# Patient Record
Sex: MALE | Race: WHITE | URBAN - METROPOLITAN AREA
[De-identification: names, ages, dates, MRNs, and addresses within clinical notes are randomized per-mention and may not be internally consistent; named-entity substitution may affect disease eponyms.]

---

## 2023-09-26 ENCOUNTER — OFFICE VISIT (OUTPATIENT)
Dept: URBAN - METROPOLITAN AREA CLINIC 2 | Facility: CLINIC | Age: 68
End: 2023-09-26

## 2023-09-26 NOTE — HPI-TODAY'S VISIT:
This patient was referred by Dr. Carlyle Tipton for an evaluation of iron deficiency anemia.  A copy of this will be sent to the referring provider.

## 2023-11-02 ENCOUNTER — OFFICE VISIT (OUTPATIENT)
Dept: URBAN - METROPOLITAN AREA CLINIC 2 | Facility: CLINIC | Age: 68
End: 2023-11-02

## 2023-12-04 ENCOUNTER — OFFICE VISIT (OUTPATIENT)
Dept: URBAN - METROPOLITAN AREA CLINIC 2 | Facility: CLINIC | Age: 68
End: 2023-12-04

## 2024-01-29 ENCOUNTER — TELEPHONE ENCOUNTER (OUTPATIENT)
Dept: URBAN - METROPOLITAN AREA CLINIC 80 | Facility: CLINIC | Age: 69
End: 2024-01-29

## 2024-01-29 ENCOUNTER — OFFICE VISIT (OUTPATIENT)
Dept: URBAN - METROPOLITAN AREA CLINIC 2 | Facility: CLINIC | Age: 69
End: 2024-01-29
Payer: MEDICARE

## 2024-01-29 VITALS
TEMPERATURE: 97.7 F | DIASTOLIC BLOOD PRESSURE: 76 MMHG | HEIGHT: 72 IN | SYSTOLIC BLOOD PRESSURE: 114 MMHG | HEART RATE: 73 BPM | BODY MASS INDEX: 34.84 KG/M2 | WEIGHT: 257.2 LBS

## 2024-01-29 DIAGNOSIS — D50.8 ACHLORHYDRIC ANEMIA: ICD-10-CM

## 2024-01-29 PROBLEM — 87522002: Status: ACTIVE | Noted: 2024-01-29

## 2024-01-29 PROCEDURE — 99243 OFF/OP CNSLTJ NEW/EST LOW 30: CPT | Performed by: NURSE PRACTITIONER

## 2024-01-29 PROCEDURE — 99203 OFFICE O/P NEW LOW 30 MIN: CPT | Performed by: NURSE PRACTITIONER

## 2024-01-29 RX ORDER — POLYETHYLENE GLYCOL 3350, SODIUM CHLORIDE, SODIUM BICARBONATE, POTASSIUM CHLORIDE 420; 11.2; 5.72; 1.48 G/4L; G/4L; G/4L; G/4L
4000ML POWDER, FOR SOLUTION ORAL
Qty: 4000 MILLILITER | Refills: 0 | OUTPATIENT
Start: 2024-01-29 | End: 2024-01-30

## 2024-01-29 NOTE — HPI-TODAY'S VISIT:
This patient was referred by Dr. Carlyle Tipton for an evaluation of Iron defiency anemia.  A copy of this will be sent to the referring provider. 68 yr old male with hx of left renal cell ca s/p resection and ablation in 2011 and 2018.. seen by oncology last year for leukocytosis. He was found to have BAHMAN and recommended see GI for evaluation for GI bleed. He was started on oral iron d/t BAHMAN. pt has been on ozempic and lost 50 pounds in past yr. stopped for a while bc of adverse effects. now on lower dose. tolerating mostly. He had gastric emptying scan 1/2023 with severe gastroparesis at 4 hr only 36% empty.  He does have GERD and has abdominal pain if eats spicy foods. also has constipation-slightly worse on oral iron had MRI notable for spot on pancreas possible IPMN -8mm was 7mm in 2018. saw Dr Bond-given size-planned to repeat imaging in 1 yr

## 2024-02-05 ENCOUNTER — OV NP (OUTPATIENT)
Dept: URBAN - METROPOLITAN AREA CLINIC 2 | Facility: CLINIC | Age: 69
End: 2024-02-05

## 2024-02-09 ENCOUNTER — COL/EGD (OUTPATIENT)
Dept: URBAN - METROPOLITAN AREA SURGERY CENTER 19 | Facility: SURGERY CENTER | Age: 69
End: 2024-02-09

## 2024-02-15 ENCOUNTER — OV NP (OUTPATIENT)
Dept: URBAN - METROPOLITAN AREA SURGERY CENTER 19 | Facility: SURGERY CENTER | Age: 69
End: 2024-02-15

## 2024-03-06 ENCOUNTER — LAB (OUTPATIENT)
Dept: URBAN - METROPOLITAN AREA CLINIC 4 | Facility: CLINIC | Age: 69
End: 2024-03-06
Payer: MEDICARE

## 2024-03-06 ENCOUNTER — COL/EGD (OUTPATIENT)
Dept: URBAN - METROPOLITAN AREA SURGERY CENTER 19 | Facility: SURGERY CENTER | Age: 69
End: 2024-03-06
Payer: MEDICARE

## 2024-03-06 DIAGNOSIS — T47.8X5A ADVERSE EFFECT OF OTHER AGENTS PRIMARILY AFFECTING GASTROINTESTINAL SYSTEM, INITIAL ENCOUNTER: ICD-10-CM

## 2024-03-06 DIAGNOSIS — K31.89 OTHER DISEASES OF STOMACH AND DUODENUM: ICD-10-CM

## 2024-03-06 DIAGNOSIS — D50.9 ANEMIA: ICD-10-CM

## 2024-03-06 PROCEDURE — 45378 DIAGNOSTIC COLONOSCOPY: CPT | Performed by: INTERNAL MEDICINE

## 2024-03-06 PROCEDURE — 88305 TISSUE EXAM BY PATHOLOGIST: CPT | Performed by: PATHOLOGY

## 2024-03-06 PROCEDURE — 43239 EGD BIOPSY SINGLE/MULTIPLE: CPT | Performed by: INTERNAL MEDICINE

## 2024-03-06 PROCEDURE — 88312 SPECIAL STAINS GROUP 1: CPT | Performed by: PATHOLOGY

## 2024-04-01 ENCOUNTER — LAB (OUTPATIENT)
Dept: URBAN - METROPOLITAN AREA CLINIC 2 | Facility: CLINIC | Age: 69
End: 2024-04-01

## 2024-04-01 ENCOUNTER — OV EP (OUTPATIENT)
Dept: URBAN - METROPOLITAN AREA CLINIC 2 | Facility: CLINIC | Age: 69
End: 2024-04-01
Payer: MEDICARE

## 2024-04-01 VITALS
DIASTOLIC BLOOD PRESSURE: 71 MMHG | TEMPERATURE: 96.9 F | HEART RATE: 69 BPM | SYSTOLIC BLOOD PRESSURE: 127 MMHG | HEIGHT: 72 IN | BODY MASS INDEX: 35.76 KG/M2 | WEIGHT: 264 LBS

## 2024-04-01 DIAGNOSIS — D50.8 OTHER IRON DEFICIENCY ANEMIA: ICD-10-CM

## 2024-04-01 DIAGNOSIS — K59.04 CHRONIC IDIOPATHIC CONSTIPATION: ICD-10-CM

## 2024-04-01 PROBLEM — 82934008: Status: ACTIVE | Noted: 2024-04-01

## 2024-04-01 PROCEDURE — 99214 OFFICE O/P EST MOD 30 MIN: CPT | Performed by: NURSE PRACTITIONER

## 2024-04-01 RX ORDER — INSULIN DEGLUDEC 200 U/ML
INJECT 70 UNITS UNDER THE SKIN DAILY INJECTION, SOLUTION SUBCUTANEOUS
Qty: 9 MILLILITER | Refills: 0 | Status: ACTIVE | COMMUNITY

## 2024-04-01 RX ORDER — TIRZEPATIDE 2.5 MG/.5ML
INJECTION, SOLUTION SUBCUTANEOUS
Qty: 2 MILLILITER | Status: ACTIVE | COMMUNITY

## 2024-04-01 NOTE — HPI-TODAY'S VISIT:
Very pleasant 68-year-old male with history of renal cell cancer status post resection and ablation in 2011 in 2018 seen in follow-up today and EGD and colonoscopy in evaluation of iron deficiency anemia.  EGD was notable for diminutive polyps in stomach and biopsy proton pump inhibitor effect.  Colonoscopy was notable for multiple sigmoid and descending diverticula and moderate internal hemorrhoids. He follows with hematology givne hx of RCC. repeat labs with improvement of iron deficiency. He has f/u with hematology next week. hehas constipation. If no BM in several days, he will take miralax. If that does not work, next day he takes milk of magnesia. He will have multiple BMs then and feels very drained. c/o increased gas/bloating.

## 2024-04-23 ENCOUNTER — PILLCAM (OUTPATIENT)
Dept: URBAN - METROPOLITAN AREA CLINIC 79 | Facility: CLINIC | Age: 69
End: 2024-04-23

## 2024-05-02 ENCOUNTER — WEB ENCOUNTER (OUTPATIENT)
Dept: URBAN - METROPOLITAN AREA CLINIC 2 | Facility: CLINIC | Age: 69
End: 2024-05-02

## 2024-05-02 ENCOUNTER — DASHBOARD ENCOUNTERS (OUTPATIENT)
Age: 69
End: 2024-05-02

## 2024-05-02 ENCOUNTER — OFFICE VISIT (OUTPATIENT)
Dept: URBAN - METROPOLITAN AREA CLINIC 2 | Facility: CLINIC | Age: 69
End: 2024-05-02
Payer: MEDICARE

## 2024-05-02 VITALS
HEART RATE: 72 BPM | WEIGHT: 264.8 LBS | TEMPERATURE: 97.8 F | DIASTOLIC BLOOD PRESSURE: 64 MMHG | HEIGHT: 72 IN | BODY MASS INDEX: 35.87 KG/M2 | SYSTOLIC BLOOD PRESSURE: 113 MMHG

## 2024-05-02 DIAGNOSIS — K59.04 CHRONIC IDIOPATHIC CONSTIPATION: ICD-10-CM

## 2024-05-02 DIAGNOSIS — R10.84 GENERALIZED ABDOMINAL CRAMPING: ICD-10-CM

## 2024-05-02 DIAGNOSIS — D50.9 IRON DEFICIENCY ANEMIA, UNSPECIFIED IRON DEFICIENCY ANEMIA TYPE: ICD-10-CM

## 2024-05-02 PROCEDURE — 99213 OFFICE O/P EST LOW 20 MIN: CPT | Performed by: NURSE PRACTITIONER

## 2024-05-02 RX ORDER — DICYCLOMINE HYDROCHLORIDE 10 MG/1
1 CAPSULE 30 CAPSULE ORAL THREE TIMES A DAY
Qty: 90 CAPSULE | Refills: 0 | OUTPATIENT
Start: 2024-05-02 | End: 2024-06-01

## 2024-05-02 RX ORDER — INSULIN DEGLUDEC 200 U/ML
INJECT 70 UNITS UNDER THE SKIN DAILY INJECTION, SOLUTION SUBCUTANEOUS
Qty: 9 MILLILITER | Refills: 0 | Status: ACTIVE | COMMUNITY

## 2024-05-02 RX ORDER — TIRZEPATIDE 2.5 MG/.5ML
INJECTION, SOLUTION SUBCUTANEOUS
Qty: 2 MILLILITER | Status: DISCONTINUED | COMMUNITY

## 2024-08-05 ENCOUNTER — WEB ENCOUNTER (OUTPATIENT)
Dept: URBAN - METROPOLITAN AREA CLINIC 2 | Facility: CLINIC | Age: 69
End: 2024-08-05

## 2024-08-05 RX ORDER — DICYCLOMINE HYDROCHLORIDE 10 MG/1
1 CAPSULE 30 CAPSULE ORAL THREE TIMES A DAY
Qty: 90 CAPSULE | Refills: 0
Start: 2024-05-02 | End: 2024-09-05

## 2025-02-27 ENCOUNTER — WEB ENCOUNTER (OUTPATIENT)
Dept: URBAN - METROPOLITAN AREA CLINIC 2 | Facility: CLINIC | Age: 70
End: 2025-02-27

## 2025-02-27 RX ORDER — DICYCLOMINE HYDROCHLORIDE 10 MG/1
1 CAPSULE 30 CAPSULE ORAL THREE TIMES A DAY
Qty: 90 CAPSULE | Refills: 0
Start: 2024-05-02 | End: 2025-03-29

## 2025-04-16 ENCOUNTER — OFFICE VISIT (OUTPATIENT)
Dept: URBAN - METROPOLITAN AREA CLINIC 2 | Facility: CLINIC | Age: 70
End: 2025-04-16
Payer: MEDICARE

## 2025-04-16 DIAGNOSIS — K59.04 CHRONIC IDIOPATHIC CONSTIPATION: ICD-10-CM

## 2025-04-16 DIAGNOSIS — K64.8 INTERNAL HEMORRHOIDS: ICD-10-CM

## 2025-04-16 DIAGNOSIS — K64.4 EXTERNAL HEMORRHOIDS: ICD-10-CM

## 2025-04-16 PROBLEM — 23913003: Status: ACTIVE | Noted: 2025-04-16

## 2025-04-16 PROCEDURE — 99214 OFFICE O/P EST MOD 30 MIN: CPT | Performed by: NURSE PRACTITIONER

## 2025-04-16 RX ORDER — HYDROCORTISONE ACETATE 25 MG/1
1 SUPPOSITORY SUPPOSITORY RECTAL ONCE A DAY
Qty: 24 | Refills: 2 | OUTPATIENT
Start: 2025-04-16 | End: 2025-05-28

## 2025-04-16 RX ORDER — INSULIN DEGLUDEC 200 U/ML
INJECT 70 UNITS UNDER THE SKIN DAILY INJECTION, SOLUTION SUBCUTANEOUS
Qty: 9 MILLILITER | Refills: 0 | Status: ACTIVE | COMMUNITY

## 2025-04-16 RX ORDER — HYDROCORTISONE 25 MG/G
1 APPLICATION CREAM TOPICAL ONCE A DAY
Qty: 28.35 GRAM | Refills: 0 | OUTPATIENT
Start: 2025-04-16

## 2025-04-16 RX ORDER — LINACLOTIDE 290 UG/1
1 CAPSULE AT LEAST 30 MINUTES BEFORE THE FIRST MEAL OF THE DAY ON AN EMPTY STOMACH CAPSULE, GELATIN COATED ORAL ONCE A DAY
Qty: 30 | OUTPATIENT
Start: 2025-04-16 | End: 2025-05-16

## 2025-04-16 RX ORDER — TIRZEPATIDE 5 MG/.5ML
INJECT 5 MG SUBCUTANEOUSLY WEEKLY INJECTION, SOLUTION SUBCUTANEOUS
Qty: 2 MILLILITER | Refills: 0 | Status: ACTIVE | COMMUNITY

## 2025-04-16 NOTE — HPI-TODAY'S VISIT:
Very pleasant 69-year-old male seen today for constipation and rectal bleeding.  He takes [id number] , stool softeners, and will add stimulants if no results.  He has poor results last week. symptoms were severe and he finally had a good bowel movement but had increased rectal pain and bleeding.  He had a colonoscopy last year notable for diverticulosis, polyps, and moderate internal hemorrhoids. Taking mounjaro for DM and weight loss

## 2025-05-05 ENCOUNTER — TELEPHONE ENCOUNTER (OUTPATIENT)
Dept: URBAN - METROPOLITAN AREA CLINIC 79 | Facility: CLINIC | Age: 70
End: 2025-05-05

## 2025-05-05 RX ORDER — LINACLOTIDE 290 UG/1
1 CAPSULE AT LEAST 30 MINUTES BEFORE THE FIRST MEAL OF THE DAY ON AN EMPTY STOMACH CAPSULE, GELATIN COATED ORAL ONCE A DAY
Qty: 30
Start: 2025-04-16 | End: 2025-06-04

## 2025-05-05 RX ORDER — ONDANSETRON HYDROCHLORIDE 4 MG/1
1 TABLET TABLET, FILM COATED ORAL EVERY 8 HOURS
Qty: 10 TABLET | Refills: 0 | OUTPATIENT
Start: 2025-05-05

## 2025-05-09 ENCOUNTER — WEB ENCOUNTER (OUTPATIENT)
Dept: URBAN - METROPOLITAN AREA CLINIC 2 | Facility: CLINIC | Age: 70
End: 2025-05-09

## 2025-05-09 RX ORDER — DICYCLOMINE HYDROCHLORIDE 10 MG/1
1 CAPSULE 30 CAPSULE ORAL THREE TIMES A DAY
Qty: 90 CAPSULE | Refills: 0
Start: 2024-05-02

## 2025-06-10 ENCOUNTER — TELEPHONE ENCOUNTER (OUTPATIENT)
Dept: URBAN - METROPOLITAN AREA CLINIC 79 | Facility: CLINIC | Age: 70
End: 2025-06-10

## 2025-06-10 RX ORDER — DICYCLOMINE HYDROCHLORIDE 10 MG/1
1 CAPSULE 30 CAPSULE ORAL THREE TIMES A DAY
Qty: 90 CAPSULE | Refills: 0
Start: 2024-05-02

## 2025-07-08 ENCOUNTER — LAB OUTSIDE AN ENCOUNTER (OUTPATIENT)
Dept: URBAN - METROPOLITAN AREA CLINIC 2 | Facility: CLINIC | Age: 70
End: 2025-07-08

## 2025-07-08 ENCOUNTER — OFFICE VISIT (OUTPATIENT)
Dept: URBAN - METROPOLITAN AREA CLINIC 2 | Facility: CLINIC | Age: 70
End: 2025-07-08
Payer: MEDICARE

## 2025-07-08 DIAGNOSIS — R19.7 DIARRHEA, UNSPECIFIED TYPE: ICD-10-CM

## 2025-07-08 DIAGNOSIS — R10.30 LOWER ABDOMINAL PAIN: ICD-10-CM

## 2025-07-08 PROCEDURE — 99214 OFFICE O/P EST MOD 30 MIN: CPT | Performed by: NURSE PRACTITIONER

## 2025-07-08 RX ORDER — HYDROCORTISONE 25 MG/G
1 APPLICATION CREAM TOPICAL ONCE A DAY
Qty: 28.35 GRAM | Refills: 0 | Status: ACTIVE | COMMUNITY
Start: 2025-04-16

## 2025-07-08 RX ORDER — TIRZEPATIDE 12.5 MG/.5ML
AS DIRECTED INJECTION, SOLUTION SUBCUTANEOUS
Refills: 0 | Status: ACTIVE | COMMUNITY

## 2025-07-08 RX ORDER — CIPROFLOXACIN 500 MG/1
1 TABLET TABLET, FILM COATED ORAL
Qty: 20 TABLET | Refills: 0 | OUTPATIENT
Start: 2025-07-08 | End: 2025-07-18

## 2025-07-08 RX ORDER — METRONIDAZOLE 500 MG/1
1 TABLET TABLET ORAL THREE TIMES A DAY
Qty: 30 | OUTPATIENT
Start: 2025-07-08 | End: 2025-07-18

## 2025-07-08 RX ORDER — ONDANSETRON HYDROCHLORIDE 4 MG/1
1 TABLET TABLET, FILM COATED ORAL EVERY 8 HOURS
Qty: 10 TABLET | Refills: 0 | Status: ACTIVE | COMMUNITY
Start: 2025-05-05

## 2025-07-08 RX ORDER — DICYCLOMINE HYDROCHLORIDE 10 MG/1
1 CAPSULE 30 CAPSULE ORAL THREE TIMES A DAY
Qty: 90 CAPSULE | Refills: 0 | Status: ACTIVE | COMMUNITY
Start: 2024-05-02

## 2025-07-08 RX ORDER — INSULIN DEGLUDEC 200 U/ML
INJECT 70 UNITS UNDER THE SKIN DAILY INJECTION, SOLUTION SUBCUTANEOUS
Qty: 9 MILLILITER | Refills: 0 | Status: ACTIVE | COMMUNITY

## 2025-07-08 NOTE — HPI-TODAY'S VISIT:
69-year-old male with history of chronic constipation seen today with lower abdominal pain.  At his last visit in April we did increase Linzess to 290 mcg. Constipation began when he started Mounjaro.  He is considering stopping or taking a break but is concerned about blood sugar control.  Constipation symptoms have been managed on Linzess 290.  However on 4 July he was eating with friends, had a few alcoholic beverages.  Later that day he developed chills and rigors.  Associated symptoms included lower abdominal pain and diarrhea.  Symptoms persisted over the weekend.  No further chills or fever.  He has continued to have diarrhea and lower abdominal pain.

## 2025-07-08 NOTE — PHYSICAL EXAM GASTROINTESTINAL
Abdomen , soft, lower, suprapubic tenderness to palpation, nondistended , no guarding or rigidity , no masses palpable , normal bowel sounds

## 2025-07-09 ENCOUNTER — OFFICE VISIT (OUTPATIENT)
Dept: URBAN - METROPOLITAN AREA CLINIC 2 | Facility: CLINIC | Age: 70
End: 2025-07-09

## 2025-07-09 LAB
A/G RATIO: 1.6
ALBUMIN: 4.2
ALKALINE PHOSPHATASE: 68
ALT (SGPT): 17
AST (SGOT): 16
BILIRUBIN, TOTAL: 0.7
BUN/CREATININE RATIO: (no result)
BUN: 18
CALCIUM: 9.5
CARBON DIOXIDE, TOTAL: 22
CHLORIDE: 105
CREATININE: 0.95
EGFR: 88
GLOBULIN, TOTAL: 2.7
GLUCOSE: 118
HEMATOCRIT: 47.1
HEMOGLOBIN: 15.5
MCH: 27.6
MCHC: 32.9
MCV: 84
MPV: 9.9
PLATELET COUNT: 290
POTASSIUM: 4
PROTEIN, TOTAL: 6.9
RDW: 13.3
RED BLOOD CELL COUNT: 5.61
SODIUM: 141
WHITE BLOOD CELL COUNT: 6.7

## 2025-07-11 ENCOUNTER — ERX REFILL RESPONSE (OUTPATIENT)
Dept: URBAN - METROPOLITAN AREA CLINIC 80 | Facility: CLINIC | Age: 70
End: 2025-07-11

## 2025-07-11 RX ORDER — DICYCLOMINE HYDROCHLORIDE 10 MG/1
1 CAPSULE 30 CAPSULE ORAL THREE TIMES A DAY
Qty: 90 CAPSULE | Refills: 0

## 2025-07-23 ENCOUNTER — OFFICE VISIT (OUTPATIENT)
Dept: URBAN - METROPOLITAN AREA CLINIC 2 | Facility: CLINIC | Age: 70
End: 2025-07-23
Payer: MEDICARE

## 2025-07-23 DIAGNOSIS — K64.8 INTERNAL HEMORRHOID: ICD-10-CM

## 2025-07-23 DIAGNOSIS — K59.04 CHRONIC IDIOPATHIC CONSTIPATION: ICD-10-CM

## 2025-07-23 PROCEDURE — 99213 OFFICE O/P EST LOW 20 MIN: CPT | Performed by: NURSE PRACTITIONER

## 2025-07-23 RX ORDER — HYDROCORTISONE 25 MG/G
1 APPLICATION CREAM TOPICAL ONCE A DAY
Qty: 28.35 GRAM | Refills: 0 | Status: ACTIVE | COMMUNITY
Start: 2025-04-16

## 2025-07-23 RX ORDER — INSULIN DEGLUDEC 200 U/ML
INJECT 70 UNITS UNDER THE SKIN DAILY INJECTION, SOLUTION SUBCUTANEOUS
Qty: 9 MILLILITER | Refills: 0 | Status: ACTIVE | COMMUNITY

## 2025-07-23 RX ORDER — ONDANSETRON HYDROCHLORIDE 4 MG/1
1 TABLET TABLET, FILM COATED ORAL EVERY 8 HOURS
Qty: 10 TABLET | Refills: 0 | Status: ACTIVE | COMMUNITY
Start: 2025-05-05

## 2025-07-23 RX ORDER — TIRZEPATIDE 12.5 MG/.5ML
AS DIRECTED INJECTION, SOLUTION SUBCUTANEOUS
Refills: 0 | Status: ACTIVE | COMMUNITY

## 2025-07-23 RX ORDER — HYDROCORTISONE 25 MG/G
1 APPLICATION CREAM TOPICAL TWICE A DAY
Qty: 28.35 GRAM | Refills: 0 | OUTPATIENT
Start: 2025-07-23 | End: 2025-08-02

## 2025-07-23 RX ORDER — DICYCLOMINE HYDROCHLORIDE 10 MG/1
1 CAPSULE 30 CAPSULE ORAL THREE TIMES A DAY
Qty: 90 CAPSULE | Refills: 0 | Status: ACTIVE | COMMUNITY

## 2025-07-23 NOTE — HPI-TODAY'S VISIT:
Very pleasant 69-year-old male seen today for complaints of painful hemorrhoid.  Patient was seen recently for diarrheal illness.  He improved with antibiotics.,  He did feel very weak to urgent care with hypotension.  He was given IV fluids his symptoms improved.  However, with the frequent diarrhea, he developed a painful hemorrhoid.  It has improved but is still painful.  He does have a history of constipation which has now recurred.  We have provided him with Linzess 290 mcg but this has only been partially effective.  MiraLAX was not very effective either. He has appointment soon with cardiology and primary care to discuss hypotension and diabetes.

## 2025-07-23 NOTE — PHYSICAL EXAM GASTROINTESTINAL
Abdomen , soft, nontender, nondistended , no guarding or rigidity , no masses palpable , normal bowel sounds , Liver and Spleen , no hepatomegaly present , no hepatosplenomegaly , liver nontender , spleen not palpable , Rectal , internal hemorrhoid, nonbleeding, Telephone Encounter by January Campos CMA at 11/16/17 12:40 PM     Author:  January Campos CMA Service:  (none) Author Type:  Certified Medical Assistant     Filed:  11/16/17 12:40 PM Encounter Date:  11/15/2017 Status:  Signed     :  January Campos CMA (Certified Medical Assistant)            Patient response noted[JV1.1T] via spouse Jacki Saldana-verbally[JV1.1M].  Please see results from[JV1.1T] 11/15/17.[JV1.1M]      Revision History        User Key Date/Time User Provider Type Action    > JV1.1 11/16/17 12:40 PM January Campos CMA Certified Medical Assistant Sign    M - Manual, T - Template

## 2025-07-25 ENCOUNTER — TELEPHONE ENCOUNTER (OUTPATIENT)
Dept: URBAN - METROPOLITAN AREA CLINIC 79 | Facility: CLINIC | Age: 70
End: 2025-07-25

## 2025-07-25 RX ORDER — PLECANATIDE 3 MG/1
1 TABLET TABLET ORAL ONCE A DAY
Qty: 30 | OUTPATIENT
Start: 2025-07-25 | End: 2025-08-24

## 2025-07-25 RX ORDER — HYDROCORTISONE 25 MG/G
1 APPLICATION CREAM TOPICAL TWICE A DAY
Qty: 28.35 GRAM | Refills: 0
Start: 2025-07-23 | End: 2025-08-04

## 2025-07-29 ENCOUNTER — WEB ENCOUNTER (OUTPATIENT)
Dept: URBAN - METROPOLITAN AREA CLINIC 2 | Facility: CLINIC | Age: 70
End: 2025-07-29

## 2025-07-29 RX ORDER — LUBIPROSTONE 24 UG/1
1 CAPSULE WITH FOOD AND WATER CAPSULE, GELATIN COATED ORAL TWICE A DAY
Qty: 60 | OUTPATIENT
Start: 2025-07-30 | End: 2025-08-29

## 2025-07-30 ENCOUNTER — WEB ENCOUNTER (OUTPATIENT)
Dept: URBAN - METROPOLITAN AREA CLINIC 2 | Facility: CLINIC | Age: 70
End: 2025-07-30

## 2025-08-24 ENCOUNTER — WEB ENCOUNTER (OUTPATIENT)
Dept: URBAN - METROPOLITAN AREA CLINIC 2 | Facility: CLINIC | Age: 70
End: 2025-08-24

## 2025-08-24 RX ORDER — LUBIPROSTONE 24 UG/1
1 CAPSULE WITH FOOD AND WATER CAPSULE, GELATIN COATED ORAL TWICE A DAY
Qty: 180 | Refills: 0
Start: 2025-07-30 | End: 2025-11-23